# Patient Record
Sex: FEMALE | Race: AMERICAN INDIAN OR ALASKA NATIVE
[De-identification: names, ages, dates, MRNs, and addresses within clinical notes are randomized per-mention and may not be internally consistent; named-entity substitution may affect disease eponyms.]

---

## 2017-10-06 ENCOUNTER — HOSPITAL ENCOUNTER (OUTPATIENT)
Dept: HOSPITAL 31 - C.CATHLAB | Age: 67
Discharge: HOME | End: 2017-10-06
Attending: INTERNAL MEDICINE
Payer: MEDICARE

## 2017-10-06 VITALS — BODY MASS INDEX: 30.9 KG/M2

## 2017-10-06 DIAGNOSIS — I20.9: ICD-10-CM

## 2017-10-06 DIAGNOSIS — E11.9: ICD-10-CM

## 2017-10-06 DIAGNOSIS — E78.5: ICD-10-CM

## 2017-10-06 DIAGNOSIS — I10: Primary | ICD-10-CM

## 2017-10-06 PROCEDURE — 82948 REAGENT STRIP/BLOOD GLUCOSE: CPT

## 2017-11-27 ENCOUNTER — HOSPITAL ENCOUNTER (OUTPATIENT)
Dept: HOSPITAL 42 - CATH | Age: 67
LOS: 1 days | Discharge: HOME | End: 2017-11-28
Attending: HOSPITALIST
Payer: MEDICARE

## 2017-11-27 VITALS — BODY MASS INDEX: 30.9 KG/M2

## 2017-11-27 DIAGNOSIS — Z87.891: ICD-10-CM

## 2017-11-27 DIAGNOSIS — E11.9: ICD-10-CM

## 2017-11-27 DIAGNOSIS — K29.70: ICD-10-CM

## 2017-11-27 DIAGNOSIS — I25.119: Primary | ICD-10-CM

## 2017-11-27 DIAGNOSIS — I10: ICD-10-CM

## 2017-11-27 DIAGNOSIS — E03.9: ICD-10-CM

## 2017-11-27 DIAGNOSIS — E78.5: ICD-10-CM

## 2017-11-27 LAB
APTT BLD: 27.6 SECONDS (ref 25.1–36.5)
BASOPHILS # BLD AUTO: 0.01 K/MM3 (ref 0–2)
BASOPHILS NFR BLD: 0.2 % (ref 0–3)
BUN SERPL-MCNC: 14 MG/DL (ref 7–21)
CALCIUM SERPL-MCNC: 10.2 MG/DL (ref 8.4–10.5)
CHLORIDE SERPL-SCNC: 98 MMOL/L (ref 98–107)
CHOLEST SERPL-MCNC: 144 MG/DL (ref 130–200)
CO2 SERPL-SCNC: 28 MMOL/L (ref 21–33)
EOSINOPHIL # BLD: 0.3 10*3/UL (ref 0–0.7)
EOSINOPHIL NFR BLD: 4.3 % (ref 1.5–5)
ERYTHROCYTE [DISTWIDTH] IN BLOOD BY AUTOMATED COUNT: 14 % (ref 11.5–14.5)
GLUCOSE SERPL-MCNC: 85 MG/DL (ref 70–110)
GRANULOCYTES # BLD: 4.01 10*3/UL (ref 1.4–6.5)
GRANULOCYTES NFR BLD: 69.1 % (ref 50–68)
HCT VFR BLD CALC: 30.6 % (ref 36–48)
INR PPP: 1.09 (ref 0.93–1.08)
LYMPHOCYTES # BLD: 1.1 10*3/UL (ref 1.2–3.4)
LYMPHOCYTES NFR BLD AUTO: 19 % (ref 22–35)
MCH RBC QN AUTO: 29.3 PG (ref 25–35)
MCHC RBC AUTO-ENTMCNC: 33.7 G/DL (ref 31–37)
MCV RBC AUTO: 87.2 FL (ref 80–105)
MONOCYTES # BLD AUTO: 0.4 10*3/UL (ref 0.1–0.6)
MONOCYTES NFR BLD: 7.4 % (ref 1–6)
PLATELET # BLD: 243 10^3/UL (ref 120–450)
PMV BLD AUTO: 8.8 FL (ref 7–11)
POTASSIUM SERPL-SCNC: 4.4 MMOL/L (ref 3.6–5)
SODIUM SERPL-SCNC: 132 MMOL/L (ref 132–148)
WBC # BLD AUTO: 5.8 10^3/UL (ref 4.5–11)

## 2017-11-27 PROCEDURE — 85025 COMPLETE CBC W/AUTO DIFF WBC: CPT

## 2017-11-27 PROCEDURE — 85175 BLOOD CLOT LYSIS TIME: CPT

## 2017-11-27 PROCEDURE — 93005 ELECTROCARDIOGRAM TRACING: CPT

## 2017-11-27 PROCEDURE — 36415 COLL VENOUS BLD VENIPUNCTURE: CPT

## 2017-11-27 PROCEDURE — 86850 RBC ANTIBODY SCREEN: CPT

## 2017-11-27 PROCEDURE — 80048 BASIC METABOLIC PNL TOTAL CA: CPT

## 2017-11-27 PROCEDURE — 80061 LIPID PANEL: CPT

## 2017-11-27 PROCEDURE — 86900 BLOOD TYPING SEROLOGIC ABO: CPT

## 2017-11-27 PROCEDURE — 99153 MOD SED SAME PHYS/QHP EA: CPT

## 2017-11-27 PROCEDURE — 85730 THROMBOPLASTIN TIME PARTIAL: CPT

## 2017-11-27 PROCEDURE — 99152 MOD SED SAME PHYS/QHP 5/>YRS: CPT

## 2017-11-27 PROCEDURE — 93454 CORONARY ARTERY ANGIO S&I: CPT

## 2017-11-27 PROCEDURE — 85610 PROTHROMBIN TIME: CPT

## 2017-11-27 NOTE — CARD
--------------- APPROVED REPORT --------------





EKG Measurement

Heart [id number]

RI [id number]

[id number]

[id number]

[id number]



<Conclusion>

Normal sinus rhythm

Normal ECG

## 2017-11-27 NOTE — CP.PCM.HP
<Mikey Callejas - Last Filed: 11/27/17 18:13>





History of Present Illness





- History of Present Illness


History of Present Illness: 





This is a 67 year old female with a past medical history of diabetes mellitus 

type 2, hypertension, hypothyroid, unspecified cyst on her foot who comes in s/

p PCI distal LAD.  The patient had an abnormal stress test done two months ago 

that showed apical perfusion deficit suggestive of stress and LAD tertiary 

ischemia.  The patient today denies any chest pain, shortness of breath, nausea

, vomiting, lightheadedness, dizziness, sick contacts, changes in vision, 

abdominal pain, syncopal episodes or any other complaints.





PMD:Dr. Santos


Cardiologist: Dr. Almonte





Past medical history: see HPI


Medications: see MAR


Surgical history:Left carotid repair, Appendectomy, Tubal ligation, hysterectomy


Allergies: NKDA


Social: Former smokers (quit 40 years ago). Occasionally drinks.  Lives with 

daughter. Can perform all ADL's.





Present on Admission





- Present on Admission


Any Indicators Present on Admission: No





Review of Systems





- Constitutional


Constitutional: As Per HPI





- EENT


Eyes: As Per HPI


Ears: As Per HPI


Nose/Mouth/Throat: As Per HPI





- Cardiovascular


Cardiovascular: As Per HPI





- Respiratory


Respiratory: As Per HPI





- Gastrointestinal


Gastrointestinal: As Per HPI





- Genitourinary


Genitourinary: As Per HPI





- Musculoskeletal


Musculoskeletal: As Per HPI





- Integumentary


Integumentary: As Per HPI





- Neurological


Neurological: As Per HPI





- Psychiatric


Psychiatric: As Per HPI





- Endocrine


Endocrine: As Per HPI





Past Patient History





- Past Medical History & Family History


Past Medical History?: Yes





- Past Social History


Smoking Status: Former Smoker





- CARDIAC


Hx Pacemaker: No





- PULMONARY


Hx Respiratory Disorders: No





- NEUROLOGICAL


Hx Neurological Disorder: Yes


Hx Migraine: Yes





- HEENT


Hx HEENT Problems: Yes





- RENAL


Hx Chronic Kidney Disease: No





- ENDOCRINE/METABOLIC


Hx Endocrine Disorders: Yes


Hx Diabetes Mellitus Type 2: Yes


Hx Hypothyroidism: Yes





- HEMATOLOGICAL/ONCOLOGICAL


Hx Blood Transfusions: No


Hx Blood Transfusion Reaction: No





- INTEGUMENTARY


Hx Dermatological Problems: Yes





- MUSCULOSKELETAL/RHEUMATOLOGICAL


Hx Musculoskeletal Disorders: Yes





- GASTROINTESTINAL


Hx Gastrointestinal Disorders: Yes


Hx Gastritis: Yes


Hx Gastroesophageal Reflux: Yes


HX Swallowing Problems: No





- GENITOURINARY/GYNECOLOGICAL


Hx Genitourinary Disorders: Yes





- PSYCHIATRIC


Hx Emotional Abuse: No


Hx Physical Abuse: No


Hx Substance Use: No





- SURGICAL HISTORY


Hx Surgeries: Yes





- ANESTHESIA


Hx Anesthesia Reactions: No


Hx Malignant Hyperthermia: No





Meds


Home Medications: 


 Home Medication List











 Medication  Instructions  Recorded  Confirmed  Type


 


Clopidogrel [Plavix] 75 mg PO DAILY #30 tab 11/28/17  Rx











Allergies/Adverse Reactions: 


 Allergies











Allergy/AdvReac Type Severity Reaction Status Date / Time


 


DUST Allergy Intermediate CONGESTION/ Uncoded 11/17/17 11:05





   CONGESTION  


 


WOOL Allergy Intermediate ITCHING/KENYA Uncoded 11/17/17 11:05





   H  


 


dust Allergy  ITCHING Uncoded 03/02/17 07:46


 


wool Allergy  RASH Uncoded 03/02/17 07:46














Physical Exam





- Head Exam


Head Exam: ATRAUMATIC, NORMAL INSPECTION, NORMOCEPHALIC





- Eye Exam


Eye Exam: EOMI, Normal appearance, PERRL.  absent: Nystagmus, Periorbital 

tenderness


Pupil Exam: NORMAL ACCOMODATION, PERRL.  absent: Irregular, Unequal





- ENT Exam


ENT Exam: Mucous Membranes Moist, Normal Oropharynx





- Respiratory Exam


Respiratory Exam: Clear to Auscultation Bilateral, NORMAL BREATHING PATTERN.  

absent: Chest Wall Tenderness, Prolonged Expiratory Phase, Respiratory Distress





- Cardiovascular Exam


Cardiovascular Exam: REGULAR RHYTHM, +S1, +S2





- GI/Abdominal Exam


GI & Abdominal Exam: Normal Bowel Sounds, Soft.  absent: Organomegaly, 

Tenderness





- Extremities Exam


Extremities exam: Positive for: normal inspection.  Negative for: joint swelling

, pedal edema, tenderness





- Back Exam


Back exam: NORMAL INSPECTION.  absent: CVA tenderness (L), CVA tenderness (R), 

paraspinal tenderness





- Neurological Exam


Neurological exam: Alert, CN II-XII Intact, Oriented x3





- Psychiatric Exam


Psychiatric exam: Normal Affect, Normal Mood





- Skin


Skin Exam: Dry, Intact, Normal Color





Results





- Vital Signs


Recent Vital Signs: 





 Last Vital Signs











Temp  98.1 F   11/27/17 12:49


 


Pulse  60   11/27/17 16:10


 


Resp  20   11/27/17 12:49


 


BP  167/76 H  11/27/17 16:10


 


Pulse Ox  100   11/27/17 12:49














- Labs


Result Diagrams: 


 11/27/17 12:18





 11/27/17 12:18


Labs: 





 Laboratory Results - last 24 hr











  11/27/17 11/27/17 11/27/17





  12:18 12:18 12:18


 


WBC  5.8  


 


RBC  3.51  


 


Hgb  10.3 L  


 


Hct  30.6 L  


 


MCV  87.2  


 


MCH  29.3  


 


MCHC  33.7  


 


RDW  14.0  


 


Plt Count  243  


 


MPV  8.8  


 


Gran %  69.1 H  


 


Lymph % (Auto)  19.0 L  


 


Mono % (Auto)  7.4 H  


 


Eos % (Auto)  4.3  


 


Baso % (Auto)  0.2  


 


Gran #  4.01  


 


Lymph #  1.1 L  


 


Mono #  0.4  


 


Eos #  0.3  


 


Baso #  0.01  


 


PT   12.0 


 


INR   1.09 H 


 


APTT   27.6 


 


Sodium    132


 


Potassium    4.4


 


Chloride    98


 


Carbon Dioxide    28


 


Anion Gap    11


 


BUN    14


 


Creatinine    0.9


 


Est GFR ( Amer)    > 60


 


Est GFR (Non-Af Amer)    > 60


 


Random Glucose    85


 


Calcium    10.2


 


Triglycerides    62


 


Cholesterol    144


 


LDL Cholesterol Direct    43


 


HDL Cholesterol    72 H


 


Blood Type   


 


Blood Type Confirm   


 


Antibody Screen   


 


BBK History Checked   














  11/27/17 11/27/17





  12:18 14:30


 


WBC  


 


RBC  


 


Hgb  


 


Hct  


 


MCV  


 


MCH  


 


MCHC  


 


RDW  


 


Plt Count  


 


MPV  


 


Gran %  


 


Lymph % (Auto)  


 


Mono % (Auto)  


 


Eos % (Auto)  


 


Baso % (Auto)  


 


Gran #  


 


Lymph #  


 


Mono #  


 


Eos #  


 


Baso #  


 


PT  


 


INR  


 


APTT  


 


Sodium  


 


Potassium  


 


Chloride  


 


Carbon Dioxide  


 


Anion Gap  


 


BUN  


 


Creatinine  


 


Est GFR ( Amer)  


 


Est GFR (Non-Af Amer)  


 


Random Glucose  


 


Calcium  


 


Triglycerides  


 


Cholesterol  


 


LDL Cholesterol Direct  


 


HDL Cholesterol  


 


Blood Type  B POSITIVE 


 


Blood Type Confirm   B POSITIVE


 


Antibody Screen  Negative 


 


BBK History Checked  No verified bt 














Assessment & Plan





- Assessment and Plan (Free Text)


Assessment: 





67 Female with history of DM, HTN, Hypothyroidism, Abnormal stress test s/p 

Cath [place] [facility] [place]





Plan: 





1. Abnormal stress test s/p Cath


-PCI to distal LAD (50% stenosis)


-Patient's last EF was 55 %.


-Patient admitted for observation. Expected to be discharged so long as no 

acute events occur overnight.





2.DM


-Hold home medications


-ISS. Accuchecks


-Diabetic diet





3.Hypertension


-Hold home meds for today. Restart tomorrow.





4.Hypothyroidism


-Continue home meds.











<Julio Francisco - Last Filed: 11/28/17 17:33>





Results





- Vital Signs


Recent Vital Signs: 





 Last Vital Signs











Temp  98 F   11/28/17 12:00


 


Pulse  62   11/28/17 12:00


 


Resp  18   11/28/17 12:00


 


BP  146/78   11/28/17 12:00


 


Pulse Ox  97   11/28/17 05:34














- Labs


Result Diagrams: 


 11/28/17 06:00





 11/28/17 06:00


Labs: 





 Laboratory Results - last 24 hr











  11/28/17 11/28/17





  06:00 06:00


 


WBC   5.4


 


RBC   3.28 L


 


Hgb   9.4 L


 


Hct   28.5 L


 


MCV   86.9


 


MCH   28.7


 


MCHC   33.0


 


RDW   14.0


 


Plt Count   226


 


MPV   9.1


 


Gran %   73.2 H


 


Lymph % (Auto)   16.2 L


 


Mono % (Auto)   7.4 H


 


Eos % (Auto)   3.0


 


Baso % (Auto)   0.2


 


Gran #   3.94


 


Lymph #   0.9 L


 


Mono #   0.4


 


Eos #   0.2


 


Baso #   0.01


 


Sodium  136 


 


Potassium  4.0 


 


Chloride  104 


 


Carbon Dioxide  27 


 


Anion Gap  10 


 


BUN  11 


 


Creatinine  0.8 


 


Est GFR ( Amer)  > 60 


 


Est GFR (Non-Af Amer)  > 60 


 


Random Glucose  78 


 


Calcium  9.0 














Attending/Attestation





- Attestation


I have personally seen and examined this patient.: Yes


I have fully participated in the care of the patient.: Yes


I have reviewed all pertinent clinical information: Yes


Notes (Text): 


I have seen and examined the patient at bedside. Agree with the above note with 

the following additions/ exceptions: Briefly this is 67 year old female with 

history of DM-2, HTN, hypothyroidism who recently had abnormal stress test 

status post cardiac cath which revealed LAD stenosis s/p drug eluting stent. 

Will start gentle hydration. Will hold metformin. Start aspirin and plavix. 

Upon discharge patient will follow up with Dr Nuno.


Dr Julio Francisco

## 2017-11-27 NOTE — CP.PCM.PN
Subjective





- Date & Time of Evaluation


Date of Evaluation: 11/27/17


Time of Evaluation: 16:59





- Subjective


Subjective: 





67 F with hx of DM, HTN, Hypothyroidism, Abnormal stress test s/p Cath


Braught to [facility] for LAD intervention





S/P Successful PCI of distal LAD (80% stenosis) with MASHA


Normal EF


Other arteries are normal





Admit to Hospitalist service for overnight observation


Please continue all her Meds


Hold Metformin for 2 days


ISS


Start Plavix 75 daily


Hydration


F/U with my office Friday





Objective





- Vital Signs/Intake and Output


Vital Signs (last 24 hours): 


 











Temp Pulse Resp BP Pulse Ox


 


 98.1 F   60   20   167/76 H  100 


 


 11/27/17 12:49  11/27/17 16:10  11/27/17 12:49  11/27/17 16:10  11/27/17 12:49











- Labs


Labs: 


 





 11/27/17 12:18 





 11/27/17 12:18 





 











PT  12.0 SECONDS (9.4-12.5)   11/27/17  12:18    


 


INR  1.09  (0.93-1.08)  H  11/27/17  12:18    


 


APTT  27.6 Seconds (25.1-36.5)   11/27/17  12:18

## 2017-11-28 VITALS — HEART RATE: 62 BPM | SYSTOLIC BLOOD PRESSURE: 146 MMHG | DIASTOLIC BLOOD PRESSURE: 78 MMHG | TEMPERATURE: 98 F

## 2017-11-28 VITALS — RESPIRATION RATE: 18 BRPM | OXYGEN SATURATION: 97 %

## 2017-11-28 LAB
BASOPHILS # BLD AUTO: 0.01 K/MM3 (ref 0–2)
BASOPHILS NFR BLD: 0.2 % (ref 0–3)
BUN SERPL-MCNC: 11 MG/DL (ref 7–21)
CALCIUM SERPL-MCNC: 9 MG/DL (ref 8.4–10.5)
CHLORIDE SERPL-SCNC: 104 MMOL/L (ref 98–107)
CO2 SERPL-SCNC: 27 MMOL/L (ref 21–33)
EOSINOPHIL # BLD: 0.2 10*3/UL (ref 0–0.7)
EOSINOPHIL NFR BLD: 3 % (ref 1.5–5)
ERYTHROCYTE [DISTWIDTH] IN BLOOD BY AUTOMATED COUNT: 14 % (ref 11.5–14.5)
GLUCOSE SERPL-MCNC: 78 MG/DL (ref 70–110)
GRANULOCYTES # BLD: 3.94 10*3/UL (ref 1.4–6.5)
GRANULOCYTES NFR BLD: 73.2 % (ref 50–68)
HCT VFR BLD CALC: 28.5 % (ref 36–48)
LYMPHOCYTES # BLD: 0.9 10*3/UL (ref 1.2–3.4)
LYMPHOCYTES NFR BLD AUTO: 16.2 % (ref 22–35)
MCH RBC QN AUTO: 28.7 PG (ref 25–35)
MCHC RBC AUTO-ENTMCNC: 33 G/DL (ref 31–37)
MCV RBC AUTO: 86.9 FL (ref 80–105)
MONOCYTES # BLD AUTO: 0.4 10*3/UL (ref 0.1–0.6)
MONOCYTES NFR BLD: 7.4 % (ref 1–6)
PLATELET # BLD: 226 10^3/UL (ref 120–450)
PMV BLD AUTO: 9.1 FL (ref 7–11)
POTASSIUM SERPL-SCNC: 4 MMOL/L (ref 3.6–5)
SODIUM SERPL-SCNC: 136 MMOL/L (ref 132–148)
WBC # BLD AUTO: 5.4 10^3/UL (ref 4.5–11)

## 2017-11-28 NOTE — CP.PCM.DIS
<Mikey Callejas - Last Filed: 11/28/17 14:14>





Provider





- Provider


Attending physician: 


Julio Francisco MD





Primary care physician: 


Sarah Nuno MD





Time Spent in preparation of Discharge (in minutes): 42





Hospital Course





- Lab Results


Lab Results: 


 Most Recent Lab Values











WBC  5.4 10^3/ul (4.5-11.0)   11/28/17  06:00    


 


RBC  3.28 10^6/uL (3.5-6.1)  L  11/28/17  06:00    


 


Hgb  9.4 g/dL (12.0-16.0)  L  11/28/17  06:00    


 


Hct  28.5 % (36.0-48.0)  L  11/28/17  06:00    


 


MCV  86.9 fl (80.0-105.0)   11/28/17  06:00    


 


MCH  28.7 pg (25.0-35.0)   11/28/17  06:00    


 


MCHC  33.0 g/dl (31.0-37.0)   11/28/17  06:00    


 


RDW  14.0 % (11.5-14.5)   11/28/17  06:00    


 


Plt Count  226 10^3/uL (120.0-450.0)   11/28/17  06:00    


 


MPV  9.1 fl (7.0-11.0)   11/28/17  06:00    


 


Gran %  73.2 % (50.0-68.0)  H  11/28/17  06:00    


 


Lymph % (Auto)  16.2 % (22.0-35.0)  L  11/28/17  06:00    


 


Mono % (Auto)  7.4 % (1.0-6.0)  H  11/28/17  06:00    


 


Eos % (Auto)  3.0 % (1.5-5.0)   11/28/17  06:00    


 


Baso % (Auto)  0.2 % (0.0-3.0)   11/28/17  06:00    


 


Gran #  3.94  (1.4-6.5)   11/28/17  06:00    


 


Lymph #  0.9  (1.2-3.4)  L  11/28/17  06:00    


 


Mono #  0.4  (0.1-0.6)   11/28/17  06:00    


 


Eos #  0.2  (0.0-0.7)   11/28/17  06:00    


 


Baso #  0.01 K/mm3 (0.0-2.0)   11/28/17  06:00    


 


PT  12.0 SECONDS (9.4-12.5)   11/27/17  12:18    


 


INR  1.09  (0.93-1.08)  H  11/27/17  12:18    


 


APTT  27.6 Seconds (25.1-36.5)   11/27/17  12:18    


 


Sodium  136 mmol/L (132-148)   11/28/17  06:00    


 


Potassium  4.0 mmol/L (3.6-5.0)   11/28/17  06:00    


 


Chloride  104 mmol/L ()   11/28/17  06:00    


 


Carbon Dioxide  27 mmol/L (21-33)   11/28/17  06:00    


 


Anion Gap  10  (10-20)   11/28/17  06:00    


 


BUN  11 mg/dL (7-21)   11/28/17  06:00    


 


Creatinine  0.8 mg/dl (0.7-1.2)   11/28/17  06:00    


 


Est GFR ( Amer)  > 60   11/28/17  06:00    


 


Est GFR (Non-Af Amer)  > 60   11/28/17  06:00    


 


Random Glucose  78 mg/dL ()   11/28/17  06:00    


 


Calcium  9.0 mg/dL (8.4-10.5)   11/28/17  06:00    


 


Triglycerides  62 mg/dL ()   11/27/17  12:18    


 


Cholesterol  144 mg/dL (130-200)   11/27/17  12:18    


 


LDL Cholesterol Direct  43 mg/dL (0-129)   11/27/17  12:18    


 


HDL Cholesterol  72 mg/dL (29-60)  H  11/27/17  12:18    


 


Blood Type  B POSITIVE   11/27/17  12:18    


 


Blood Type Confirm  B POSITIVE   11/27/17  14:30    


 


Antibody Screen  Negative   11/27/17  12:18    


 


BBK History Checked  No verified bt   11/27/17  12:18    














- Hospital Course


Hospital Course: 





This is a 67 year old female with a past medical history of diabetes mellitus 

type 2, hypertension, hypothyroid, unspecified cyst on her foot who comes in s/

p PCI distal LAD.  The patient had an abnormal stress test done two months ago 

that showed apical perfusion deficit suggestive of stress and LAD tertiary 

ischemia.  The patient today denies any chest pain, shortness of breath, nausea

, vomiting, lightheadedness, dizziness, sick contacts, changes in vision, 

abdominal pain, syncopal episodes or any other complaints.  The patient was 

admitted for observation s/p stent placement of the distal LAD.  The patient 

was seen by Dr. Almonte while admitted to the hospital.  The patient after the 

procedure had some blood at the incision site and as a results had a pressure 

bandage placed.  There were no other reported problems after.  The patient was 

seen and examined today.  The patient was afebrile and no signs of a white 

count.  The patient was discharged with instructions to follow up with Dr. Almonte 

and to restart Metformin in 72 hours after the stent placement.  The patient 

was advised to return to the hospital for any new or worsening symptoms.





Discharge Exam





- Head Exam


Head Exam: ATRAUMATIC, NORMAL INSPECTION, NORMOCEPHALIC





- Eye Exam


Eye Exam: EOMI, Normal appearance, PERRL


Pupil Exam: NORMAL ACCOMODATION, PERRL.  absent: Irregular, Unequal





- ENT Exam


ENT Exam: Mucous Membranes Moist, Normal Oropharynx





- Respiratory Exam


Respiratory Exam: Clear to PA & Lateral, NORMAL BREATHING PATTERN, UNREMARKABLE





- Cardiovascular Exam


Cardiovascular Exam: REGULAR RHYTHM, +S1, +S2





- GI/Abdominal Exam


GI & Abdominal Exam: Normal Bowel Sounds, Unremarkable.  absent: Hypoactive 

Bowel Sounds, Organomegaly





- Back Exam


Back exam: NORMAL INSPECTION.  absent: CVA tenderness (L), CVA tenderness (R), 

paraspinal tenderness





- Neurological Exam


Neurological exam: Alert, CN II-XII Intact, Oriented x3





- Psychiatric Exam


Psychiatric exam: Normal Affect, Normal Mood





- Skin


Skin Exam: Dry, Intact, Normal Color





Discharge Plan





- Discharge Medications


Prescriptions: 


Clopidogrel [Plavix] 75 mg PO DAILY #30 tab





- Follow Up Plan


Condition: GOOD


Disposition: HOME/ ROUTINE


Instructions:  Right Heart Catheterization (DC), Heart Healthy Diet (DC), Low 

Sodium Diet (DC), Coronary Intravascular Stent Placement (DC), Coronary 

Angioplasty (DC)


Additional Instructions: 


Patient is to follow up with PMD within one week of discharge.


Patient is to restart Metformin 72 hours after cath procedure.


Patient advised to return to emergency department for any new or worsening 

symptoms.


Referrals: 


Sarah Nuno MD [Primary Care Provider] - 





<Julio Francisco - Last Filed: 11/28/17 17:35>





Provider





- Provider


Attending physician: 


Julio Francisco MD





Primary care physician: 


Sarah Nuno MD








Hospital Course





- Lab Results


Lab Results: 


 Most Recent Lab Values











WBC  5.4 10^3/ul (4.5-11.0)   11/28/17  06:00    


 


RBC  3.28 10^6/uL (3.5-6.1)  L  11/28/17  06:00    


 


Hgb  9.4 g/dL (12.0-16.0)  L  11/28/17  06:00    


 


Hct  28.5 % (36.0-48.0)  L  11/28/17  06:00    


 


MCV  86.9 fl (80.0-105.0)   11/28/17  06:00    


 


MCH  28.7 pg (25.0-35.0)   11/28/17  06:00    


 


MCHC  33.0 g/dl (31.0-37.0)   11/28/17  06:00    


 


RDW  14.0 % (11.5-14.5)   11/28/17  06:00    


 


Plt Count  226 10^3/uL (120.0-450.0)   11/28/17  06:00    


 


MPV  9.1 fl (7.0-11.0)   11/28/17  06:00    


 


Gran %  73.2 % (50.0-68.0)  H  11/28/17  06:00    


 


Lymph % (Auto)  16.2 % (22.0-35.0)  L  11/28/17  06:00    


 


Mono % (Auto)  7.4 % (1.0-6.0)  H  11/28/17  06:00    


 


Eos % (Auto)  3.0 % (1.5-5.0)   11/28/17  06:00    


 


Baso % (Auto)  0.2 % (0.0-3.0)   11/28/17  06:00    


 


Gran #  3.94  (1.4-6.5)   11/28/17  06:00    


 


Lymph #  0.9  (1.2-3.4)  L  11/28/17  06:00    


 


Mono #  0.4  (0.1-0.6)   11/28/17  06:00    


 


Eos #  0.2  (0.0-0.7)   11/28/17  06:00    


 


Baso #  0.01 K/mm3 (0.0-2.0)   11/28/17  06:00    


 


PT  12.0 SECONDS (9.4-12.5)   11/27/17  12:18    


 


INR  1.09  (0.93-1.08)  H  11/27/17  12:18    


 


APTT  27.6 Seconds (25.1-36.5)   11/27/17  12:18    


 


Sodium  136 mmol/L (132-148)   11/28/17  06:00    


 


Potassium  4.0 mmol/L (3.6-5.0)   11/28/17  06:00    


 


Chloride  104 mmol/L ()   11/28/17  06:00    


 


Carbon Dioxide  27 mmol/L (21-33)   11/28/17  06:00    


 


Anion Gap  10  (10-20)   11/28/17  06:00    


 


BUN  11 mg/dL (7-21)   11/28/17  06:00    


 


Creatinine  0.8 mg/dl (0.7-1.2)   11/28/17  06:00    


 


Est GFR ( Amer)  > 60   11/28/17  06:00    


 


Est GFR (Non-Af Amer)  > 60   11/28/17  06:00    


 


Random Glucose  78 mg/dL ()   11/28/17  06:00    


 


Calcium  9.0 mg/dL (8.4-10.5)   11/28/17  06:00    


 


Triglycerides  62 mg/dL ()   11/27/17  12:18    


 


Cholesterol  144 mg/dL (130-200)   11/27/17  12:18    


 


LDL Cholesterol Direct  43 mg/dL (0-129)   11/27/17  12:18    


 


HDL Cholesterol  72 mg/dL (29-60)  H  11/27/17  12:18    


 


Blood Type  B POSITIVE   11/27/17  12:18    


 


Blood Type Confirm  B POSITIVE   11/27/17  14:30    


 


Antibody Screen  Negative   11/27/17  12:18    


 


BBK History Checked  No verified bt   11/27/17  12:18    














Attending/Attestation





- Attestation


I have personally seen and examined this patient.: Yes


I have fully participated in the care of the patient.: Yes


I have reviewed all pertinent clinical information, including history, physical 

exam and plan: Yes


Notes (Text): 





I have seen and examined the patient at bedside. Agree with the above note with 

the following additions/ exceptions: Briefly this is 67 year old female with 

history of DM-2, HTN, hypothyroidism who recently had abnormal stress test 

status post cardiac cath which revealed LAD stenosis s/p drug eluting stent. 

Patient was given gentle hydration. Advised patient to hold metformin for 48 

hours. Continue aspirin and plavix. Upon discharge patient will follow up with 

Dr Nuno.


Dr Julio Francisco

## 2017-12-04 NOTE — CARDCATH
PROCEDURE DATE:  11/27/2017



PROCEDURES:  Left anterior descending coronary artery balloon angioplasty

and drug-eluting stent placement.



CLINICAL INDICATIONS:

1.  Angina and coronary artery disease.

2.  Abnormal stress test.

3.  Diabetes.

4.  Hyperlipidemia.



PERFORMING PHYSICIAN:  Nuno Almonte MD.



PROCEDURE:  After informed consent, the patient was prepped and draped in

the usual sterile fashion.  A 2% lidocaine was given in the right groin for

local anesthesia.  Using micropuncture technique, a 6-Brazilian sheath was

introduced into right common femoral artery.



The patient was preloaded with aspirin, Plavix, and IV heparin.  ACT was

maintained above 250.



Left main coronary artery was engaged using XBLAD 3.5, 6-Brazilian guided

catheter.  Initial angiogram confirmed 80% stenosis of the distal LAD. 

There was ZAIDA-2 flow distal to the stenosis.  The lesion was threaded

using Runthrough coronary wire.  A balloon angioplasty was performed using

2 x 12 compliant balloon.  Stented with 2.25 x 18 Xience Alpine

drug-eluting stent.  Excellent final angiographic results with brisk ZAIDA-3

flow noted.



CONCLUSIONS:

1.  Successful balloon angioplasty and drug-eluting stent placement of the

distal left anterior descending coronary artery.

2.  Recommend Plavix for one year.  Recommend aspirin, beta-blockers, and

statins for life.







__________________________________________

Nuno Almonte MD



DD:  12/04/2017 0:25:41

DT:  12/04/2017 4:00:44

Job # [id number]

## 2018-09-14 ENCOUNTER — HOSPITAL ENCOUNTER (INPATIENT)
Dept: HOSPITAL 31 - C.ER | Age: 68
LOS: 3 days | Discharge: HOME | DRG: 641 | End: 2018-09-17
Attending: INTERNAL MEDICINE | Admitting: INTERNAL MEDICINE
Payer: MEDICARE

## 2018-09-14 VITALS — BODY MASS INDEX: 30.9 KG/M2

## 2018-09-14 DIAGNOSIS — E78.00: ICD-10-CM

## 2018-09-14 DIAGNOSIS — Z79.84: ICD-10-CM

## 2018-09-14 DIAGNOSIS — F17.200: ICD-10-CM

## 2018-09-14 DIAGNOSIS — E03.9: ICD-10-CM

## 2018-09-14 DIAGNOSIS — I25.10: ICD-10-CM

## 2018-09-14 DIAGNOSIS — D50.9: ICD-10-CM

## 2018-09-14 DIAGNOSIS — I10: ICD-10-CM

## 2018-09-14 DIAGNOSIS — E87.1: Primary | ICD-10-CM

## 2018-09-14 DIAGNOSIS — M81.0: ICD-10-CM

## 2018-09-14 DIAGNOSIS — Z90.710: ICD-10-CM

## 2018-09-14 DIAGNOSIS — E78.5: ICD-10-CM

## 2018-09-14 DIAGNOSIS — Z90.49: ICD-10-CM

## 2018-09-14 DIAGNOSIS — E11.9: ICD-10-CM

## 2018-09-14 DIAGNOSIS — Z95.5: ICD-10-CM

## 2018-09-14 LAB
ALBUMIN SERPL-MCNC: 4.9 G/DL (ref 3.5–5)
ALBUMIN/GLOB SERPL: 1.8 {RATIO} (ref 1–2.1)
ALT SERPL-CCNC: 25 U/L (ref 9–52)
AST SERPL-CCNC: 25 U/L (ref 14–36)
BASOPHILS # BLD AUTO: 0 K/UL (ref 0–0.2)
BASOPHILS NFR BLD: 0.6 % (ref 0–2)
BUN SERPL-MCNC: 13 MG/DL (ref 7–17)
BUN SERPL-MCNC: 13 MG/DL (ref 7–17)
CALCIUM SERPL-MCNC: 9.6 MG/DL (ref 8.6–10.4)
CALCIUM SERPL-MCNC: 9.8 MG/DL (ref 8.6–10.4)
EOSINOPHIL # BLD AUTO: 0.2 K/UL (ref 0–0.7)
EOSINOPHIL NFR BLD: 3 % (ref 0–4)
ERYTHROCYTE [DISTWIDTH] IN BLOOD BY AUTOMATED COUNT: 12.1 % (ref 11.5–14.5)
GFR NON-AFRICAN AMERICAN: > 60
GFR NON-AFRICAN AMERICAN: > 60
HDLC SERPL-MCNC: 65 MG/DL (ref 30–70)
HGB BLD-MCNC: 10.4 G/DL (ref 11–16)
LDLC SERPL-MCNC: < 30 MG/DL (ref 0–129)
LYMPHOCYTES # BLD AUTO: 0.9 K/UL (ref 1–4.3)
LYMPHOCYTES NFR BLD AUTO: 17.5 % (ref 20–40)
MCH RBC QN AUTO: 29.9 PG (ref 27–31)
MCHC RBC AUTO-ENTMCNC: 34.6 G/DL (ref 33–37)
MCV RBC AUTO: 86.3 FL (ref 81–99)
MONOCYTES # BLD: 0.5 K/UL (ref 0–0.8)
MONOCYTES NFR BLD: 9 % (ref 0–10)
NEUTROPHILS # BLD: 3.6 K/UL (ref 1.8–7)
NEUTROPHILS NFR BLD AUTO: 69.9 % (ref 50–75)
NRBC BLD AUTO-RTO: 0.1 % (ref 0–2)
OSMOLALITY,URINE: 180 MOSM/KG (ref 300–1000)
PLATELET # BLD: 250 K/UL (ref 130–400)
PMV BLD AUTO: 7.5 FL (ref 7.2–11.7)
RBC # BLD AUTO: 3.48 MIL/UL (ref 3.8–5.2)
WBC # BLD AUTO: 5.1 K/UL (ref 4.8–10.8)

## 2018-09-14 RX ADMIN — ROSUVASTATIN CALCIUM SCH MG: 5 TABLET, FILM COATED ORAL at 19:36

## 2018-09-15 LAB
ALBUMIN SERPL-MCNC: 3.8 G/DL (ref 3.5–5)
ALBUMIN/GLOB SERPL: 1.2 {RATIO} (ref 1–2.1)
ALT SERPL-CCNC: 22 U/L (ref 9–52)
AST SERPL-CCNC: 24 U/L (ref 14–36)
BACTERIA #/AREA URNS HPF: (no result) /[HPF]
BASOPHILS # BLD AUTO: 0 K/UL (ref 0–0.2)
BASOPHILS NFR BLD: 0.5 % (ref 0–2)
BILIRUB UR-MCNC: NEGATIVE MG/DL
BUN SERPL-MCNC: 12 MG/DL (ref 7–17)
BUN SERPL-MCNC: 12 MG/DL (ref 7–17)
CALCIUM SERPL-MCNC: 9.3 MG/DL (ref 8.6–10.4)
CALCIUM SERPL-MCNC: 9.8 MG/DL (ref 8.6–10.4)
EOSINOPHIL # BLD AUTO: 0.1 K/UL (ref 0–0.7)
EOSINOPHIL NFR BLD: 3.1 % (ref 0–4)
ERYTHROCYTE [DISTWIDTH] IN BLOOD BY AUTOMATED COUNT: 12.1 % (ref 11.5–14.5)
GFR NON-AFRICAN AMERICAN: > 60
GFR NON-AFRICAN AMERICAN: > 60
GLUCOSE UR STRIP-MCNC: NORMAL MG/DL
HGB BLD-MCNC: 10.6 G/DL (ref 11–16)
LEUKOCYTE ESTERASE UR-ACNC: (no result) LEU/UL
LYMPHOCYTES # BLD AUTO: 0.7 K/UL (ref 1–4.3)
LYMPHOCYTES NFR BLD AUTO: 15.4 % (ref 20–40)
MCH RBC QN AUTO: 29.5 PG (ref 27–31)
MCHC RBC AUTO-ENTMCNC: 34.2 G/DL (ref 33–37)
MCV RBC AUTO: 86.3 FL (ref 81–99)
MONOCYTES # BLD: 0.3 K/UL (ref 0–0.8)
MONOCYTES NFR BLD: 7.5 % (ref 0–10)
NEUTROPHILS # BLD: 3.4 K/UL (ref 1.8–7)
NEUTROPHILS NFR BLD AUTO: 73.5 % (ref 50–75)
NRBC BLD AUTO-RTO: 0 % (ref 0–2)
PH UR STRIP: 7 [PH] (ref 5–8)
PLATELET # BLD: 243 K/UL (ref 130–400)
PMV BLD AUTO: 7.6 FL (ref 7.2–11.7)
PROT UR STRIP-MCNC: NEGATIVE MG/DL
RBC # BLD AUTO: 3.59 MIL/UL (ref 3.8–5.2)
RBC # UR STRIP: NEGATIVE /UL
SP GR UR STRIP: 1 (ref 1–1.03)
SQUAMOUS EPITHIAL: < 1 /HPF (ref 0–5)
UROBILINOGEN UR-MCNC: NORMAL MG/DL (ref 0.2–1)
WBC # BLD AUTO: 4.6 K/UL (ref 4.8–10.8)

## 2018-09-15 RX ADMIN — PANTOPRAZOLE SODIUM SCH MG: 40 TABLET, DELAYED RELEASE ORAL at 10:11

## 2018-09-15 RX ADMIN — ROSUVASTATIN CALCIUM SCH MG: 5 TABLET, FILM COATED ORAL at 17:06

## 2018-09-15 RX ADMIN — ENOXAPARIN SODIUM SCH MG: 40 INJECTION SUBCUTANEOUS at 10:08

## 2018-09-16 VITALS — OXYGEN SATURATION: 100 %

## 2018-09-16 LAB
ALBUMIN SERPL-MCNC: 3.7 G/DL (ref 3.5–5)
ALBUMIN/GLOB SERPL: 1.3 {RATIO} (ref 1–2.1)
ALT SERPL-CCNC: 20 U/L (ref 9–52)
AST SERPL-CCNC: 19 U/L (ref 14–36)
BASOPHILS # BLD AUTO: 0 K/UL (ref 0–0.2)
BASOPHILS NFR BLD: 0.4 % (ref 0–2)
BUN SERPL-MCNC: 8 MG/DL (ref 7–17)
CALCIUM SERPL-MCNC: 8.9 MG/DL (ref 8.6–10.4)
EOSINOPHIL # BLD AUTO: 0.1 K/UL (ref 0–0.7)
EOSINOPHIL NFR BLD: 2.5 % (ref 0–4)
ERYTHROCYTE [DISTWIDTH] IN BLOOD BY AUTOMATED COUNT: 12.2 % (ref 11.5–14.5)
GFR NON-AFRICAN AMERICAN: > 60
HGB BLD-MCNC: 10.1 G/DL (ref 11–16)
LYMPHOCYTES # BLD AUTO: 0.8 K/UL (ref 1–4.3)
LYMPHOCYTES NFR BLD AUTO: 16 % (ref 20–40)
MCH RBC QN AUTO: 29.8 PG (ref 27–31)
MCHC RBC AUTO-ENTMCNC: 34.2 G/DL (ref 33–37)
MCV RBC AUTO: 87.1 FL (ref 81–99)
MONOCYTES # BLD: 0.5 K/UL (ref 0–0.8)
MONOCYTES NFR BLD: 9 % (ref 0–10)
NEUTROPHILS # BLD: 3.8 K/UL (ref 1.8–7)
NEUTROPHILS NFR BLD AUTO: 72.1 % (ref 50–75)
NRBC BLD AUTO-RTO: 0 % (ref 0–2)
PLATELET # BLD: 228 K/UL (ref 130–400)
PMV BLD AUTO: 7.8 FL (ref 7.2–11.7)
RBC # BLD AUTO: 3.38 MIL/UL (ref 3.8–5.2)
WBC # BLD AUTO: 5.3 K/UL (ref 4.8–10.8)

## 2018-09-16 RX ADMIN — PANTOPRAZOLE SODIUM SCH MG: 40 TABLET, DELAYED RELEASE ORAL at 10:56

## 2018-09-16 RX ADMIN — ROSUVASTATIN CALCIUM SCH MG: 5 TABLET, FILM COATED ORAL at 18:08

## 2018-09-16 RX ADMIN — ENOXAPARIN SODIUM SCH MG: 40 INJECTION SUBCUTANEOUS at 11:00

## 2018-09-17 VITALS — TEMPERATURE: 97.6 F | RESPIRATION RATE: 20 BRPM

## 2018-09-17 VITALS — SYSTOLIC BLOOD PRESSURE: 145 MMHG | HEART RATE: 62 BPM | DIASTOLIC BLOOD PRESSURE: 64 MMHG

## 2018-09-17 LAB
ALBUMIN SERPL-MCNC: 3.7 G/DL (ref 3.5–5)
ALBUMIN/GLOB SERPL: 1.3 {RATIO} (ref 1–2.1)
ALT SERPL-CCNC: 23 U/L (ref 9–52)
AST SERPL-CCNC: 22 U/L (ref 14–36)
BASOPHILS # BLD AUTO: 0 K/UL (ref 0–0.2)
BASOPHILS NFR BLD: 0.2 % (ref 0–2)
BUN SERPL-MCNC: 6 MG/DL (ref 7–17)
CALCIUM SERPL-MCNC: 9.2 MG/DL (ref 8.6–10.4)
EOSINOPHIL # BLD AUTO: 0.2 K/UL (ref 0–0.7)
EOSINOPHIL NFR BLD: 4.2 % (ref 0–4)
ERYTHROCYTE [DISTWIDTH] IN BLOOD BY AUTOMATED COUNT: 12.3 % (ref 11.5–14.5)
GFR NON-AFRICAN AMERICAN: > 60
HGB BLD-MCNC: 10 G/DL (ref 11–16)
LYMPHOCYTES # BLD AUTO: 0.8 K/UL (ref 1–4.3)
LYMPHOCYTES NFR BLD AUTO: 15.4 % (ref 20–40)
MCH RBC QN AUTO: 29.8 PG (ref 27–31)
MCHC RBC AUTO-ENTMCNC: 34.3 G/DL (ref 33–37)
MCV RBC AUTO: 86.8 FL (ref 81–99)
MONOCYTES # BLD: 0.3 K/UL (ref 0–0.8)
MONOCYTES NFR BLD: 6.5 % (ref 0–10)
NEUTROPHILS # BLD: 3.7 K/UL (ref 1.8–7)
NEUTROPHILS NFR BLD AUTO: 73.7 % (ref 50–75)
NRBC BLD AUTO-RTO: 0 % (ref 0–2)
PLATELET # BLD: 227 K/UL (ref 130–400)
PMV BLD AUTO: 7.9 FL (ref 7.2–11.7)
RBC # BLD AUTO: 3.37 MIL/UL (ref 3.8–5.2)
WBC # BLD AUTO: 5 K/UL (ref 4.8–10.8)

## 2018-09-17 RX ADMIN — PANTOPRAZOLE SODIUM SCH MG: 40 TABLET, DELAYED RELEASE ORAL at 09:49

## 2018-09-17 RX ADMIN — ENOXAPARIN SODIUM SCH MG: 40 INJECTION SUBCUTANEOUS at 09:50
